# Patient Record
(demographics unavailable — no encounter records)

---

## 2024-12-11 NOTE — END OF VISIT
[FreeTextEntry3] :   I, Bety Bhavya, acted as a scribe on behalf of Dr. Gabby Gomez M.D  on 12/11/2024.   All medical entries made by the scribe were at my, Dr. Gabby Gomez M.D ,  direction and personally dictated by me on 12/11/2024. I have reviewed the chart and agree that the record accurately reflects my personal performance of the history, physical exam, assessment and plan. I have also personally directed, reviewed, and agreed with the chart.

## 2024-12-11 NOTE — HISTORY OF PRESENT ILLNESS
[FreeTextEntry1] : 40 year  old female  Last seen for PPV in July. Pt is still breastfeeding. No currently on OCP, using condoms for contraception. Reports last menses was heavy. Otherwise doing well   C/sx2, preeclampsia

## 2024-12-11 NOTE — PLAN
[FreeTextEntry1] : 40 year  old female P2 presents for annual visit.   HCM Pap/hpv done today  Rx mammo  Suggested mirena  RTO IUD insertion or 1yr annual